# Patient Record
Sex: MALE | Race: OTHER | Employment: OTHER | URBAN - METROPOLITAN AREA
[De-identification: names, ages, dates, MRNs, and addresses within clinical notes are randomized per-mention and may not be internally consistent; named-entity substitution may affect disease eponyms.]

---

## 2017-06-09 NOTE — PATIENT DISCUSSION
CONJUNCTIVAL FOREIGN BODY, OD: REMOVED IN OFFICE WITH EASE USING J[employer]S FORCEPS AND PROPARACAINE. PRESCRIBED OFLOXACIN QID OD X 5 DAYS AND ARTIFICIAL TEARS QID FOR COMFORT. RETURN FOR FOLLOW UP AS SCHEDULED OR SOONER IF SYMPTOMS ARE NOT IMPROVING.

## 2017-06-09 NOTE — PATIENT DISCUSSION
Corneal Abrasion Counseling: I have explained to the patient that most minor corneal abrasions fully recover  without permanent eye damage. I have further explained that deeper scratches can cause corneal infections, erosion, chronic loose epithelium  or scarring if not treated properly. These complications can result in long-term vision problems. I have explained that proper healing is dependent on patient compliance of the treatment prescribed and to follow up as scheduled. The patient was instructed to keep water and any material including makeup if applicable out of the affected eye. The patient has been instructed to report any unusual symptoms during or following healing to include a recurrence of pain.

## 2017-06-09 NOTE — PATIENT DISCUSSION
CORNEAL ABRASION: OD, SECONDARY TO CONJ FOREIGN BODY. PRESCRIBE: OFLOXAXIN QID X 5 DAYS AND AT'S QID. RETURN FOR FOLLOW-UP AS SCHEDULED.

## 2017-06-09 NOTE — PATIENT DISCUSSION
New Prescription: ofloxacin (ofloxacin): drops: 0.3% 1 drop four times a day as directed into right eye 06-

## 2018-02-07 NOTE — PATIENT DISCUSSION
Corneal Abrasion Counseling: I have explained to the patient that most minor corneal abrasions fully recover  without permanent eye damage. I have further explained that deeper scratches can cause corneal infections, erosion, chronic loose epithelium  or scarring if not treated properly. These complications can result in long-term vision problems. I have explained that proper healing is dependent on patient compliance of the treatment prescribed and to follow up as scheduled. It was explained that a bandage contact lens may increase the possibility of an infection and the alternative treatments including patching or lubrication alone were provided to the patient. The patient has elected to proceed with the bandage contact lens for comfort and convenience. The patient was instructed to keep water and any material including makeup if applicable out of the affected eye. The patient has been instructed to report any unusual symptoms during or following healing to include a recurrence of pain.

## 2018-02-07 NOTE — PATIENT DISCUSSION
New Prescription: erythromycin (erythromycin): ointment: 5 mg/gram (0.5 %) a small amount at bedtime into affected eye 02-

## 2018-02-07 NOTE — PATIENT DISCUSSION
CORNEAL ABRASION: OD, PRESCRIBE BESIVANCE EVERY 2 HOURS TODAY THEN QID THEREAFTER. RX EMYCIN TATIANA. RETURN FOR FOLLOW-UP AS SCHEDULED.

## 2018-02-07 NOTE — PATIENT DISCUSSION
New Prescription: Besivance (besifloxacin): drops,suspension: 0.6% 1 drop four times a day as directed into affected eye 02-

## 2022-10-14 ENCOUNTER — NEW PATIENT (OUTPATIENT)
Dept: URBAN - METROPOLITAN AREA CLINIC 42 | Facility: CLINIC | Age: 62
End: 2022-10-14

## 2022-10-14 DIAGNOSIS — H52.4: ICD-10-CM

## 2022-10-14 DIAGNOSIS — H52.223: ICD-10-CM

## 2022-10-14 DIAGNOSIS — H52.03: ICD-10-CM

## 2022-10-14 DIAGNOSIS — E11.9: ICD-10-CM

## 2022-10-14 PROCEDURE — 92015 DETERMINE REFRACTIVE STATE: CPT

## 2022-10-14 PROCEDURE — 92004 COMPRE OPH EXAM NEW PT 1/>: CPT

## 2022-10-14 ASSESSMENT — TONOMETRY
OS_IOP_MMHG: 14
OD_IOP_MMHG: 13

## 2022-10-14 ASSESSMENT — KERATOMETRY
OD_AXISANGLE_DEGREES: 108
OD_AXISANGLE2_DEGREES: 18
OD_K1POWER_DIOPTERS: 42.0
OD_K2POWER_DIOPTERS: 42.0
OS_K2POWER_DIOPTERS: 42.25
OS_K1POWER_DIOPTERS: 42.75
OS_AXISANGLE2_DEGREES: 165
OS_AXISANGLE_DEGREES: 075

## 2022-10-14 ASSESSMENT — VISUAL ACUITY
OD_CC: 20/80
OD_SC: 20/400
OS_SC: 20/200
OD_SC: 20/200
OS_SC: 20/400
OS_CC: 20/40

## 2023-10-17 ASSESSMENT — KERATOMETRY
OD_AXISANGLE2_DEGREES: 18
OD_K2POWER_DIOPTERS: 42.0
OD_AXISANGLE_DEGREES: 108
OS_AXISANGLE_DEGREES: 075
OS_K2POWER_DIOPTERS: 42.25
OS_AXISANGLE2_DEGREES: 165
OS_K1POWER_DIOPTERS: 42.75
OD_K1POWER_DIOPTERS: 42.0

## 2023-10-20 ENCOUNTER — PREPPED CHART (OUTPATIENT)
Dept: URBAN - METROPOLITAN AREA CLINIC 42 | Facility: CLINIC | Age: 63
End: 2023-10-20

## 2023-10-20 DIAGNOSIS — H52.223: ICD-10-CM

## 2023-10-20 DIAGNOSIS — H52.03: ICD-10-CM

## 2023-10-20 DIAGNOSIS — E11.9: ICD-10-CM

## 2023-10-20 DIAGNOSIS — H52.4: ICD-10-CM

## 2023-10-20 PROCEDURE — 92014 COMPRE OPH EXAM EST PT 1/>: CPT

## 2023-10-20 PROCEDURE — 92015 DETERMINE REFRACTIVE STATE: CPT
